# Patient Record
Sex: MALE | Race: WHITE | NOT HISPANIC OR LATINO | Employment: STUDENT | URBAN - METROPOLITAN AREA
[De-identification: names, ages, dates, MRNs, and addresses within clinical notes are randomized per-mention and may not be internally consistent; named-entity substitution may affect disease eponyms.]

---
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
## 2025-01-27 ENCOUNTER — APPOINTMENT (OUTPATIENT)
Dept: RADIOLOGY | Facility: CLINIC | Age: 20
End: 2025-01-27
Payer: COMMERCIAL

## 2025-01-27 ENCOUNTER — APPOINTMENT (OUTPATIENT)
Dept: ORTHOPEDIC SURGERY | Facility: CLINIC | Age: 20
End: 2025-01-27
Payer: COMMERCIAL

## 2025-02-03 ENCOUNTER — HOSPITAL ENCOUNTER (OUTPATIENT)
Dept: RADIOLOGY | Facility: CLINIC | Age: 20
Discharge: HOME | End: 2025-02-03
Payer: COMMERCIAL

## 2025-02-03 ENCOUNTER — APPOINTMENT (OUTPATIENT)
Dept: ORTHOPEDIC SURGERY | Facility: CLINIC | Age: 20
End: 2025-02-03
Payer: COMMERCIAL

## 2025-02-03 VITALS — BODY MASS INDEX: 20.44 KG/M2 | WEIGHT: 146 LBS | HEIGHT: 71 IN

## 2025-02-03 DIAGNOSIS — M76.891 HAMSTRING TENDINITIS OF RIGHT THIGH: Primary | ICD-10-CM

## 2025-02-03 DIAGNOSIS — M25.561 RIGHT KNEE PAIN, UNSPECIFIED CHRONICITY: ICD-10-CM

## 2025-02-03 PROCEDURE — 73564 X-RAY EXAM KNEE 4 OR MORE: CPT | Mod: RT

## 2025-02-03 PROCEDURE — 3008F BODY MASS INDEX DOCD: CPT | Performed by: STUDENT IN AN ORGANIZED HEALTH CARE EDUCATION/TRAINING PROGRAM

## 2025-02-03 PROCEDURE — 1036F TOBACCO NON-USER: CPT | Performed by: STUDENT IN AN ORGANIZED HEALTH CARE EDUCATION/TRAINING PROGRAM

## 2025-02-03 PROCEDURE — 99214 OFFICE O/P EST MOD 30 MIN: CPT | Performed by: STUDENT IN AN ORGANIZED HEALTH CARE EDUCATION/TRAINING PROGRAM

## 2025-02-03 RX ORDER — ONDANSETRON 4 MG/1
4 TABLET, ORALLY DISINTEGRATING ORAL EVERY 8 HOURS PRN
COMMUNITY

## 2025-02-03 ASSESSMENT — PAIN - FUNCTIONAL ASSESSMENT: PAIN_FUNCTIONAL_ASSESSMENT: NO/DENIES PAIN

## 2025-02-03 NOTE — PROGRESS NOTES
PRIMARY CARE PHYSICIAN: Amina Flores MD  REFERRING PROVIDER: No referring provider defined for this encounter.     CONSULT ORTHOPAEDIC: Knee Evaluation    ASSESSMENT & PLAN    Impression: 19 y.o. male with right knee hamstring tendinitis and tightness.    Plan:   I explained to the patient the nature of their diagnosis.  I reviewed their imaging studies with them.    Based on the history, physical exam and imaging studies above, the patient's presentation is consistent with the above diagnosis.  I had a long discussion with the patient regarding their presentation and the treatment options.  We discussed initial nonoperative versus operative management options as well as potential further diagnostic imaging.  I reviewed his x-ray findings with him.  His examination is most consistent with hamstring tendinitis secondary to associated hamstring tightness.  We discussed the importance of good flexibility and progressive strengthening.  He will work on this at the gym.  I offered him a physical therapy referral but he states that he can do this on his own.  He will take anti-inflammatories as needed and return to see me as needed.    Follow-Up: Patient will follow-up as needed    At the end of the visit, all questions were answered in full. The patient is in agreement with the plan and recommendations. They will call the office with any questions/concerns.    Note dictated with [employer] software. Completed without full typed error editing and sent to avoid delay.       SUBJECTIVE  CHIEF COMPLAINT:   Chief Complaint   Patient presents with    Right Knee - Pain        HPI: Mike Flood is a 19 y.o. patient who presents today with right knee problems for the past 1 month. Pain is localized globally in the knee but is worse posteriorly. Reports crepitus, some swelling, but no significant instability or mechanical symptoms. No Hx of trauma, Sx, or injections to the area. No formal PT. Advil PRN.      They deny any constant or progressive numbness or tingling in their legs.     REVIEW OF SYSTEMS  Constitutional: See HPI for pain assessment, No significant weight loss, recent trauma  Cardiovascular: No chest pain, shortness of breath  Respiratory: No difficulty breathing, cough  Gastrointestinal: No nausea, vomiting, diarrhea, constipation  Musculoskeletal: Noted in HPI, positive for pain, restricted motion, stiffness  Integumentary: No rashes, easy bruising, redness   Neurological: no numbness or tingling in extremities, no gait disturbances   Psychiatric: No mood changes, memory changes, social issues  Heme/Lymph: no excessive swelling, easy bruising, excessive bleeding  ENT: No hearing changes  Eyes: No vision changes    No past medical history on file.     Allergies   Allergen Reactions    Augmentin [Amoxicillin-Pot Clavulanate] Swelling    Penicillins Hives        Past Surgical History:   Procedure Laterality Date    KNEE      OTHER SURGICAL HISTORY  03/26/2021    Tonsillectomy with adenoidectomy    OTHER SURGICAL HISTORY  03/26/2021    Ear surgery        No family history on file.     Social History     Socioeconomic History    Marital status: Single     Spouse name: Not on file    Number of children: Not on file    Years of education: Not on file    Highest education level: Not on file   Occupational History    Not on file   Tobacco Use    Smoking status: Never    Smokeless tobacco: Never   Substance and Sexual Activity    Alcohol use: Not on file    Drug use: Never    Sexual activity: Not on file   Other Topics Concern    Not on file   Social History Narrative    Not on file     Social Drivers of Health     Financial Resource Strain: Not on file   Food Insecurity: Not on file   Transportation Needs: Not on file   Physical Activity: Not on file   Stress: Not on file   Social Connections: Not on file   Intimate Partner Violence: Not on file   Housing Stability: Not on file        CURRENT MEDICATIONS:  "  Current Outpatient Medications   Medication Sig Dispense Refill    ondansetron ODT (Zofran-ODT) 4 mg disintegrating tablet Dissolve 1 tablet (4 mg) in the mouth every 8 hours if needed for nausea.      naproxen (Naprosyn) 500 mg tablet Take 1 tablet (500 mg) by mouth every 12 hours. (Patient not taking: Reported on 2/3/2025) 20 tablet 0     No current facility-administered medications for this visit.        OBJECTIVE    PHYSICAL EXAM      10/20/2021     3:42 PM 2/16/2022     4:24 PM 4/4/2024     9:16 AM 8/4/2024    12:59 PM 11/2/2024    10:01 PM 11/3/2024     1:15 AM 2/3/2025    11:14 AM   Vitals   Systolic 107  122 127 130 120    Diastolic 60  68 71 71 70    BP Location   Left arm       Heart Rate 72 78 50 76 62 67    Temp 37 °C (98.6 °F) 37.2 °C (98.9 °F) 36.9 °C (98.4 °F) 37.6 °C (99.7 °F) 36.6 °C (97.9 °F)     Resp 14 16 16 16 20 18    Height   1.829 m (6') 1.829 m (6') 1.803 m (5' 11\")  1.803 m (5' 11\")   Weight (lb)  146.6 155 145 155  146   BMI   21.02 kg/m2 19.67 kg/m2 21.62 kg/m2  20.36 kg/m2   BSA (m2)   1.89 m2 1.83 m2 1.88 m2  1.82 m2   Visit Report       Report      Body mass index is 20.36 kg/m².    GENERAL: A/Ox3, NAD. Appears healthy, well nourished  PSYCHIATRIC: Mood stable, appropriate memory recall  EYES: EOM intact, no scleral icterus  CARDIOVASCULAR: Palpable peripheral pulses  LUNGS: Breathing non-labored on room air  SKIN: no erythema, rashes, or ecchymoses     MUSCULOSKELETAL:  Laterality: right Knee Exam  - Skin intact  - No erythema or warmth  - No ecchymosis or soft tissue swelling  - Alignment: neutral  - Palpation: Positive tenderness distally along the distal hamstring tendons and proximal gastroc  - ROM: 0-0-135, patella mobility 1+ medial and lateral with trace crepitus, no apprehension  - Effusion: None  - Strength: knee extension and flexion 5/5, EHL/PF/DF motor intact  - Stability:        Anterior drawer stable       Posterior drawer stable       Varus/valgus stable       " negative Lachman  - negative Deepa's  - Gait: Nonantalgic  - Hip Exam: flexion to 100+ degrees, full extension, internal/external rotation adequate, and no pain with log roll  - Special Tests: Positive hamstring tightness    NEUROVASCULAR:  - Neurovascular Status: sensation intact to light touch distally, lower extremity motor intact  - Capillary refill brisk at extremities, Bilateral dorsalis pedis pulse 2+    Imaging: Multiple views of the affected right knee(s) demonstrate: No significant osseous normality, no fracture, no significant degenerative change.   X-rays were personally reviewed and interpreted by me.  Radiology reports were reviewed by me as well, if readily available at the time.      Shailesh Davis MD  Attending Surgeon    Sports Medicine Orthopaedic Surgery  [facility] [place]  [facility]

## 2025-08-17 ENCOUNTER — APPOINTMENT (OUTPATIENT)
Dept: RADIOLOGY | Facility: HOSPITAL | Age: 20
End: 2025-08-17
Payer: COMMERCIAL

## 2025-08-17 ENCOUNTER — HOSPITAL ENCOUNTER (EMERGENCY)
Facility: HOSPITAL | Age: 20
Discharge: HOME | End: 2025-08-17
Payer: COMMERCIAL

## 2025-08-17 VITALS
OXYGEN SATURATION: 100 % | HEIGHT: 71 IN | SYSTOLIC BLOOD PRESSURE: 113 MMHG | TEMPERATURE: 97 F | RESPIRATION RATE: 18 BRPM | BODY MASS INDEX: 20.3 KG/M2 | HEART RATE: 75 BPM | WEIGHT: 145 LBS | DIASTOLIC BLOOD PRESSURE: 64 MMHG

## 2025-08-17 DIAGNOSIS — S90.32XA CONTUSION OF LEFT FOOT, INITIAL ENCOUNTER: Primary | ICD-10-CM

## 2025-08-17 PROCEDURE — 73630 X-RAY EXAM OF FOOT: CPT | Mod: LEFT SIDE | Performed by: RADIOLOGY

## 2025-08-17 PROCEDURE — 73630 X-RAY EXAM OF FOOT: CPT | Mod: LT

## 2025-08-17 PROCEDURE — 99283 EMERGENCY DEPT VISIT LOW MDM: CPT

## 2025-08-17 ASSESSMENT — LIFESTYLE VARIABLES
HAVE PEOPLE ANNOYED YOU BY CRITICIZING YOUR DRINKING: NO
EVER FELT BAD OR GUILTY ABOUT YOUR DRINKING: NO
TOTAL SCORE: 0
HAVE YOU EVER FELT YOU SHOULD CUT DOWN ON YOUR DRINKING: NO
EVER HAD A DRINK FIRST THING IN THE MORNING TO STEADY YOUR NERVES TO GET RID OF A HANGOVER: NO

## 2025-08-17 ASSESSMENT — PAIN SCALES - GENERAL: PAINLEVEL_OUTOF10: 4

## 2025-08-17 ASSESSMENT — PAIN DESCRIPTION - PAIN TYPE: TYPE: ACUTE PAIN

## 2025-08-17 ASSESSMENT — PAIN DESCRIPTION - LOCATION: LOCATION: FOOT

## 2025-08-17 ASSESSMENT — PAIN - FUNCTIONAL ASSESSMENT: PAIN_FUNCTIONAL_ASSESSMENT: 0-10
